# Patient Record
Sex: FEMALE | Race: WHITE | NOT HISPANIC OR LATINO | Employment: UNEMPLOYED | URBAN - METROPOLITAN AREA
[De-identification: names, ages, dates, MRNs, and addresses within clinical notes are randomized per-mention and may not be internally consistent; named-entity substitution may affect disease eponyms.]

---

## 2023-03-24 PROBLEM — M67.432 GANGLION CYST OF DORSUM OF LEFT WRIST: Status: ACTIVE | Noted: 2023-03-24

## 2023-03-24 PROBLEM — L85.3 DRY SKIN: Status: ACTIVE | Noted: 2023-03-24

## 2023-03-24 PROBLEM — R06.02 SHORTNESS OF BREATH AT REST: Status: ACTIVE | Noted: 2023-03-24

## 2023-03-24 PROBLEM — H69.90 EUSTACHIAN TUBE DYSFUNCTION: Status: ACTIVE | Noted: 2023-03-24

## 2023-03-24 PROBLEM — R07.89 CHEST PRESSURE: Status: ACTIVE | Noted: 2023-03-24

## 2023-03-24 PROBLEM — M62.838 MUSCLE SPASM: Status: ACTIVE | Noted: 2023-03-24

## 2023-03-24 PROBLEM — E87.6 HYPOKALEMIA: Status: ACTIVE | Noted: 2023-03-24

## 2023-03-24 PROBLEM — J02.9 SORE THROAT: Status: ACTIVE | Noted: 2023-03-24

## 2023-03-24 PROBLEM — M79.669 CALF PAIN: Status: ACTIVE | Noted: 2023-03-24

## 2023-03-24 PROBLEM — D23.72 DERMATOFIBROMA OF LEFT THIGH: Status: ACTIVE | Noted: 2023-03-24

## 2023-03-24 PROBLEM — R15.2 FECAL URGENCY: Status: ACTIVE | Noted: 2023-03-24

## 2023-03-24 PROBLEM — R20.2 PARESTHESIAS: Status: ACTIVE | Noted: 2023-03-24

## 2023-03-24 PROBLEM — E55.9 VITAMIN D DEFICIENCY: Status: ACTIVE | Noted: 2023-03-24

## 2023-03-24 PROBLEM — M25.50 POLYARTHRALGIA: Status: ACTIVE | Noted: 2023-03-24

## 2023-03-24 PROBLEM — R25.3 FASCICULATIONS OF MUSCLE: Status: ACTIVE | Noted: 2023-03-24

## 2023-03-24 PROBLEM — R59.0 AXILLARY LYMPHADENOPATHY: Status: ACTIVE | Noted: 2023-03-24

## 2023-03-24 PROBLEM — R00.2 PALPITATIONS: Status: ACTIVE | Noted: 2023-03-24

## 2023-03-24 PROBLEM — J06.9 URTI (ACUTE UPPER RESPIRATORY INFECTION): Status: ACTIVE | Noted: 2023-03-24

## 2023-03-24 PROBLEM — H60.10 CELLULITIS OF EXTERNAL EAR: Status: ACTIVE | Noted: 2023-03-24

## 2023-03-24 PROBLEM — H93.8X9 SENSATION OF PLUGGED EAR: Status: ACTIVE | Noted: 2023-03-24

## 2023-03-24 PROBLEM — I49.1 PAC (PREMATURE ATRIAL CONTRACTION): Status: ACTIVE | Noted: 2023-03-24

## 2023-03-24 PROBLEM — R59.9 ENLARGED LYMPH NODES: Status: ACTIVE | Noted: 2023-03-24

## 2023-03-24 PROBLEM — Z04.9 CONDITION NOT FOUND: Status: ACTIVE | Noted: 2023-03-24

## 2023-03-24 PROBLEM — M62.89 MUSCLE TIGHTNESS: Status: ACTIVE | Noted: 2023-03-24

## 2023-03-24 PROBLEM — M62.81 RIGHT-SIDED MUSCLE WEAKNESS: Status: ACTIVE | Noted: 2023-03-24

## 2023-03-24 RX ORDER — ONDANSETRON 4 MG/1
TABLET, FILM COATED ORAL
COMMUNITY
Start: 2022-12-07 | End: 2023-08-10 | Stop reason: ALTCHOICE

## 2023-03-24 RX ORDER — CYCLOBENZAPRINE HCL 5 MG
5 TABLET ORAL NIGHTLY PRN
COMMUNITY
End: 2023-09-05

## 2023-03-24 RX ORDER — VITAMIN A, ASCORBIC ACID, CHOLECALCIFEROL, TOCOPHEROL, THIAMINE MONONITRATE, RIBOFLAVIN, PYRIDOXINE, FOLIC ACID, CYANOCOBALAMIN, CALCIUM CARBONATE, FERROUS FUMARATE, ZINC OXIDE, CUPRIC OXIDE, NIACINAMIDE, AND FISH OIL 27-1-250MG
KIT ORAL DAILY
COMMUNITY
Start: 2022-10-07 | End: 2024-01-03 | Stop reason: ALTCHOICE

## 2023-03-25 ENCOUNTER — OFFICE VISIT (OUTPATIENT)
Dept: PRIMARY CARE | Facility: CLINIC | Age: 28
End: 2023-03-25
Payer: COMMERCIAL

## 2023-03-25 VITALS
SYSTOLIC BLOOD PRESSURE: 104 MMHG | DIASTOLIC BLOOD PRESSURE: 68 MMHG | TEMPERATURE: 98.2 F | HEIGHT: 67 IN | BODY MASS INDEX: 25.11 KG/M2 | WEIGHT: 160 LBS

## 2023-03-25 DIAGNOSIS — L98.9 ECZEMATOUS SKIN LESIONS: Primary | ICD-10-CM

## 2023-03-25 PROCEDURE — 1036F TOBACCO NON-USER: CPT | Performed by: FAMILY MEDICINE

## 2023-03-25 PROCEDURE — 99213 OFFICE O/P EST LOW 20 MIN: CPT | Performed by: FAMILY MEDICINE

## 2023-03-25 ASSESSMENT — PATIENT HEALTH QUESTIONNAIRE - PHQ9
SUM OF ALL RESPONSES TO PHQ9 QUESTIONS 1 AND 2: 0
2. FEELING DOWN, DEPRESSED OR HOPELESS: NOT AT ALL
1. LITTLE INTEREST OR PLEASURE IN DOING THINGS: NOT AT ALL

## 2023-03-25 NOTE — PROGRESS NOTES
"Chief complaint:   Chief Complaint   Patient presents with    Rash     On back, foot        HPI:  Yue Cannon is a 28 y.o. female who presents for evaluation of itchy skin since the end of December. She states she has had this itchy skin and has been tested for cholestasis by her OBGYN (pregnant). She has a few patches that are itchier and come and go in the same spots.     Physical exam:  /68 (BP Location: Left arm, Patient Position: Sitting)   Temp 36.8 °C (98.2 °F)   Ht 1.702 m (5' 7\")   Wt 72.6 kg (160 lb)   BMI 25.06 kg/m²   General: NAD, well appearing female  Skin: dry, few patches of erythematous area back and right dorsal foot    Assessment/Plan   Problem List Items Addressed This Visit    None  Visit Diagnoses       Eczematous skin lesions    -  Primary        Stop use of dryer sheets, fabric softener and switch to unscented detergent. Recommend use of Dove sensitive soap. Moisturize with unscented lotion. Recommend follow up in 1 mo if not improved, sooner if sx change or worsen    Ivy Nicole, DO        "

## 2023-08-10 ENCOUNTER — OFFICE VISIT (OUTPATIENT)
Dept: PRIMARY CARE | Facility: CLINIC | Age: 28
End: 2023-08-10
Payer: COMMERCIAL

## 2023-08-10 VITALS
TEMPERATURE: 98.7 F | BODY MASS INDEX: 24.59 KG/M2 | WEIGHT: 157 LBS | SYSTOLIC BLOOD PRESSURE: 102 MMHG | DIASTOLIC BLOOD PRESSURE: 64 MMHG

## 2023-08-10 DIAGNOSIS — H60.12 CELLULITIS OF LEFT EXTERNAL EAR: Primary | ICD-10-CM

## 2023-08-10 PROCEDURE — 99213 OFFICE O/P EST LOW 20 MIN: CPT | Performed by: FAMILY MEDICINE

## 2023-08-10 PROCEDURE — 1036F TOBACCO NON-USER: CPT | Performed by: FAMILY MEDICINE

## 2023-08-10 RX ORDER — METOPROLOL TARTRATE 25 MG/1
1 TABLET, FILM COATED ORAL EVERY 12 HOURS
COMMUNITY
Start: 2023-08-07

## 2023-08-10 RX ORDER — LANOLIN ALCOHOL/MO/W.PET/CERES
1 CREAM (GRAM) TOPICAL EVERY 12 HOURS
COMMUNITY
Start: 2023-07-17 | End: 2024-01-03 | Stop reason: ALTCHOICE

## 2023-08-10 RX ORDER — SULFAMETHOXAZOLE AND TRIMETHOPRIM 800; 160 MG/1; MG/1
1 TABLET ORAL 2 TIMES DAILY
Qty: 14 TABLET | Refills: 0 | Status: SHIPPED | OUTPATIENT
Start: 2023-08-10 | End: 2023-08-17

## 2023-08-10 ASSESSMENT — PATIENT HEALTH QUESTIONNAIRE - PHQ9
2. FEELING DOWN, DEPRESSED OR HOPELESS: NOT AT ALL
1. LITTLE INTEREST OR PLEASURE IN DOING THINGS: NOT AT ALL
SUM OF ALL RESPONSES TO PHQ9 QUESTIONS 1 AND 2: 0

## 2023-08-10 ASSESSMENT — ENCOUNTER SYMPTOMS: DEPRESSION: 0

## 2023-08-10 NOTE — PROGRESS NOTES
Subjective   Patient ID: [id number]     Yue Cannon is a 28 y.o. female who presents for Earache (Left ear (inside ear and around piercing)).  HPI    She complains of left ear pain around a piercing.  She had the piercing over a year ago.  She states the pain just started today.      No drainage.      Has swollen tissue in the area for the last five months or so.      No hearing problems.  No dizziness.              Objective     /64 (BP Location: Right arm, Patient Position: Sitting)   Temp 37.1 °C (98.7 °F)   Wt 71.2 kg (157 lb)   BMI 24.59 kg/m²      Physical Exam  Constitutional:       Appearance: Normal appearance.   HENT:      Right Ear: Ear canal normal.      Left Ear: Ear canal normal.      Ears:      Comments: TM retracted on the left.  EAC normal.  Posterior margin of pinna tender and swollen in a 1 cm area surrounding to helical piercings.        Neurological:      Mental Status: She is alert.         Assessment/Plan   Problem List Items Addressed This Visit       Cellulitis of external ear - Primary    Relevant Medications    sulfamethoxazole-trimethoprim (Bactrim DS) 800-160 mg tablet     I prescribed antibiotics. I recommend sudafed for your nasal congestion and internal ear pain. Your outer ear pain is likely a skin infection. Return in one week to see Dr Ivy Nicole.  Slick Ndiaye DO

## 2023-08-10 NOTE — PATIENT INSTRUCTIONS
I prescribed antibiotics. I recommend sudafed for your nasal congestion and internal ear pain. Your outer ear pain is likely a skin infection. Return in one week to see Dr Ivy Nicole.

## 2023-09-05 ENCOUNTER — OFFICE VISIT (OUTPATIENT)
Dept: PRIMARY CARE | Facility: CLINIC | Age: 28
End: 2023-09-05
Payer: COMMERCIAL

## 2023-09-05 VITALS — SYSTOLIC BLOOD PRESSURE: 118 MMHG | DIASTOLIC BLOOD PRESSURE: 70 MMHG

## 2023-09-05 DIAGNOSIS — I47.10 SVT (SUPRAVENTRICULAR TACHYCARDIA) (CMS-HCC): ICD-10-CM

## 2023-09-05 DIAGNOSIS — R20.2 TINGLING: Primary | ICD-10-CM

## 2023-09-05 PROBLEM — Z97.5 IUD (INTRAUTERINE DEVICE) IN PLACE: Status: RESOLVED | Noted: 2023-07-11 | Resolved: 2023-09-05

## 2023-09-05 PROBLEM — R55 NEAR SYNCOPE: Status: RESOLVED | Noted: 2023-09-05 | Resolved: 2023-09-05

## 2023-09-05 PROBLEM — L63.9 ALOPECIA AREATA, UNSPECIFIED: Status: RESOLVED | Noted: 2021-07-01 | Resolved: 2023-09-05

## 2023-09-05 PROBLEM — D64.9 NORMOCHROMIC NORMOCYTIC ANEMIA: Status: RESOLVED | Noted: 2023-04-25 | Resolved: 2023-09-05

## 2023-09-05 PROBLEM — M79.601 RIGHT ARM PAIN: Status: RESOLVED | Noted: 2023-06-25 | Resolved: 2023-09-05

## 2023-09-05 PROBLEM — K58.2 IRRITABLE BOWEL SYNDROME WITH BOTH CONSTIPATION AND DIARRHEA: Status: ACTIVE | Noted: 2023-06-25

## 2023-09-05 LAB
ALANINE AMINOTRANSFERASE (SGPT) (U/L) IN SER/PLAS: 34 U/L (ref 7–45)
ALBUMIN (G/DL) IN SER/PLAS: 4.9 G/DL (ref 3.4–5)
ALKALINE PHOSPHATASE (U/L) IN SER/PLAS: 57 U/L (ref 33–110)
ANION GAP IN SER/PLAS: 11 MMOL/L (ref 10–20)
ASPARTATE AMINOTRANSFERASE (SGOT) (U/L) IN SER/PLAS: 22 U/L (ref 9–39)
BILIRUBIN TOTAL (MG/DL) IN SER/PLAS: 0.4 MG/DL (ref 0–1.2)
CALCIUM (MG/DL) IN SER/PLAS: 10 MG/DL (ref 8.6–10.3)
CARBON DIOXIDE, TOTAL (MMOL/L) IN SER/PLAS: 30 MMOL/L (ref 21–32)
CHLORIDE (MMOL/L) IN SER/PLAS: 101 MMOL/L (ref 98–107)
CREATININE (MG/DL) IN SER/PLAS: 0.71 MG/DL (ref 0.5–1.05)
ERYTHROCYTE DISTRIBUTION WIDTH (RATIO) BY AUTOMATED COUNT: 14.3 % (ref 11.5–14.5)
ERYTHROCYTE MEAN CORPUSCULAR HEMOGLOBIN CONCENTRATION (G/DL) BY AUTOMATED: 31.2 G/DL (ref 32–36)
ERYTHROCYTE MEAN CORPUSCULAR VOLUME (FL) BY AUTOMATED COUNT: 91 FL (ref 80–100)
ERYTHROCYTES (10*6/UL) IN BLOOD BY AUTOMATED COUNT: 4.46 X10E12/L (ref 4–5.2)
GFR FEMALE: >90 ML/MIN/1.73M2
GLUCOSE (MG/DL) IN SER/PLAS: 87 MG/DL (ref 74–99)
HEMATOCRIT (%) IN BLOOD BY AUTOMATED COUNT: 40.7 % (ref 36–46)
HEMOGLOBIN (G/DL) IN BLOOD: 12.7 G/DL (ref 12–16)
LEUKOCYTES (10*3/UL) IN BLOOD BY AUTOMATED COUNT: 5.2 X10E9/L (ref 4.4–11.3)
PLATELETS (10*3/UL) IN BLOOD AUTOMATED COUNT: 200 X10E9/L (ref 150–450)
POTASSIUM (MMOL/L) IN SER/PLAS: 4.1 MMOL/L (ref 3.5–5.3)
PROTEIN TOTAL: 7.6 G/DL (ref 6.4–8.2)
SODIUM (MMOL/L) IN SER/PLAS: 138 MMOL/L (ref 136–145)
THYROTROPIN (MIU/L) IN SER/PLAS BY DETECTION LIMIT <= 0.05 MIU/L: 2.63 MIU/L (ref 0.44–3.98)
UREA NITROGEN (MG/DL) IN SER/PLAS: 13 MG/DL (ref 6–23)

## 2023-09-05 PROCEDURE — 99214 OFFICE O/P EST MOD 30 MIN: CPT | Performed by: FAMILY MEDICINE

## 2023-09-05 PROCEDURE — 85027 COMPLETE CBC AUTOMATED: CPT

## 2023-09-05 PROCEDURE — 80053 COMPREHEN METABOLIC PANEL: CPT

## 2023-09-05 PROCEDURE — 93000 ELECTROCARDIOGRAM COMPLETE: CPT | Performed by: FAMILY MEDICINE

## 2023-09-05 PROCEDURE — 84443 ASSAY THYROID STIM HORMONE: CPT

## 2023-09-05 PROCEDURE — 1036F TOBACCO NON-USER: CPT | Performed by: FAMILY MEDICINE

## 2023-09-05 NOTE — PROGRESS NOTES
Chief complaint:   Chief Complaint   Patient presents with    Numbness     Slight numbness on left of face for one week    Photophobia     10 weeks        HPI:  Yue Cannon is a 28 y.o. female who presents for evaluation of numbness she has felt on the left side of her face for 1 week in duration with some intermittent photophobia for 10 weeks or so. The face feels tingling but no weakness. The tingling comes and goes. No rash. She is not sleeping well (had baby 6/24/2023). She fainted 10 days ago and states this is the first time she has fainted. She has a cardiologist since giving birth as she has developed SVT. She forgot to take her Metoprolol the day of fainting.  She got heart palpitations then fainted 10 days ago. She has not been seen for this. She is breast feeding. She last saw her cardiologist early August with discussion of ablation in the future. Prior to starting medication she was getting sx 12 times daily. Now on the medication she gets the sx once every few days lasting 5 seconds or less.     She has sx  of tingling on her left side before after giving birth in the past but this occurred once for 1 day and not this long. (Son is 9 and daughter is 8). She had a CT in the past when this happened but states it was normal. She last had an MRI of the brain 3/21/2022.     She she had an earache secondary to piercing with cellulitis around her left ear seen and treated with Bactrim 8/10/2023. She took the piecing out and took the abx without residual sx.     She is still taking Prenatals. She had her 6 week postpartum visit without issues. She had an uneventful delivery.    Physical exam:  /70   General: NAD, well appearing female  Heart: variability noted in speed of heart rate when auscultating, no murmur appreciated  Lungs: CTAB, no wheezes, rales, rhonchi  Abdomen: soft, non tender, normoactive BS, no organomegaly  Extremities: No LE edema  Neuro: sensation intact to light touch on the face,  motor strength facial normal, no facial droop, CN II-XII grossly intact    Assessment/Plan   Problem List Items Addressed This Visit    None  Visit Diagnoses       Tingling    -  Primary    Relevant Orders    CBC    Comprehensive Metabolic Panel    Tsh With Reflex To Free T4 If Abnormal    SVT (supraventricular tachycardia) (CMS/HCC)        Relevant Orders    ECG 12 lead (Clinic Performed)        Reviewed MRI brain from 3/2022, patient has had similar tingling on the left side with her previous 2 pregnancy/births though not lasting this long. She had an uneventful delivery in June and a recent left sided facial infection treated within the month. EKG performed and recommend her to call her cardiologist especially as she had a syncopal episode after not taking her Metoprolol 10 days ago. She has heart rate variability noted on EKG. Labs were ordered. Follow up 1 weeks pending sx and results.     Ivy Nicole, DO

## 2023-09-14 PROBLEM — R55 SYNCOPE: Status: ACTIVE | Noted: 2023-09-14

## 2023-09-16 ENCOUNTER — HOSPITAL ENCOUNTER (OUTPATIENT)
Facility: HOSPITAL | Age: 28
Setting detail: OUTPATIENT SURGERY
End: 2023-09-16
Attending: INTERNAL MEDICINE | Admitting: INTERNAL MEDICINE
Payer: COMMERCIAL

## 2023-09-16 DIAGNOSIS — R55 SYNCOPE AND COLLAPSE: ICD-10-CM

## 2023-09-16 DIAGNOSIS — I47.10 SUPRAVENTRICULAR TACHYCARDIA (CMS-HCC): ICD-10-CM

## 2023-09-19 ENCOUNTER — OFFICE VISIT (OUTPATIENT)
Dept: PRIMARY CARE | Facility: CLINIC | Age: 28
End: 2023-09-19
Payer: COMMERCIAL

## 2023-09-19 VITALS — SYSTOLIC BLOOD PRESSURE: 116 MMHG | DIASTOLIC BLOOD PRESSURE: 70 MMHG

## 2023-09-19 DIAGNOSIS — M79.89 SOFT TISSUE MASS: ICD-10-CM

## 2023-09-19 DIAGNOSIS — R20.2 PARESTHESIAS: Primary | ICD-10-CM

## 2023-09-19 PROCEDURE — 99213 OFFICE O/P EST LOW 20 MIN: CPT | Performed by: FAMILY MEDICINE

## 2023-09-19 PROCEDURE — 1036F TOBACCO NON-USER: CPT | Performed by: FAMILY MEDICINE

## 2023-09-19 NOTE — PROGRESS NOTES
Chief complaint:   Chief Complaint   Patient presents with    Follow-up     Follow up for face numbness    Arm Swelling     Swelling and pain in both arms        HPI:  Yue Cannon is a 28 y.o. female who presents for evaluation of persistent intermittent facial numbness and pain and swelling in her forearms right mainly and is has been present since she gave birth. She does have a neurologist but has not been assessed for this. She is having a test performed in October by her EP provider.     Physical exam:  /70   General: NAD, well appearing female  Heart: RRR, no mumur appreciated  Lungs: CTAB, no wheezes, rales, rhonchi  Abdomen: soft, non tender, normoactive BS, no organomegaly  Extremities: No LE edema, right forearm with fullness and firmness to the medial proximal area which is larger the the left.     Assessment/Plan   Problem List Items Addressed This Visit       Paresthesias - Primary    Soft tissue mass    Relevant Orders    US extremity nonvascular real time w image documentation limited anatomic specific     Follow up with neurology   Ivy Nicole DO

## 2023-10-02 ENCOUNTER — TELEPHONE (OUTPATIENT)
Dept: PRIMARY CARE | Facility: CLINIC | Age: 28
End: 2023-10-02
Payer: COMMERCIAL

## 2023-10-02 DIAGNOSIS — R20.0 FACIAL NUMBNESS: Primary | ICD-10-CM

## 2023-10-02 NOTE — TELEPHONE ENCOUNTER
Pt was not able to get into neurology until December. She would like an MRI order placed due to the numbness on the left side of her face

## 2023-10-04 ENCOUNTER — OFFICE VISIT (OUTPATIENT)
Dept: NEUROLOGY | Facility: CLINIC | Age: 28
End: 2023-10-04
Payer: COMMERCIAL

## 2023-10-04 VITALS
DIASTOLIC BLOOD PRESSURE: 74 MMHG | SYSTOLIC BLOOD PRESSURE: 108 MMHG | HEIGHT: 67 IN | WEIGHT: 152 LBS | TEMPERATURE: 97.3 F | BODY MASS INDEX: 23.86 KG/M2 | RESPIRATION RATE: 18 BRPM

## 2023-10-04 DIAGNOSIS — R20.2 NUMBNESS AND TINGLING: ICD-10-CM

## 2023-10-04 DIAGNOSIS — R20.0 NUMBNESS AND TINGLING: ICD-10-CM

## 2023-10-04 DIAGNOSIS — R20.0 LEFT FACIAL NUMBNESS: Primary | ICD-10-CM

## 2023-10-04 PROCEDURE — 99214 OFFICE O/P EST MOD 30 MIN: CPT | Performed by: PSYCHIATRY & NEUROLOGY

## 2023-10-04 PROCEDURE — 1036F TOBACCO NON-USER: CPT | Performed by: PSYCHIATRY & NEUROLOGY

## 2023-10-04 NOTE — PROGRESS NOTES
HPI  28 y.o. female presenting for follow up regarding neurological symptoms.    The patient was seen by me for right sided muscle twitching and right sided weakness.   MRI Brain and C Spine were unremarkable.  Since the last visit, she became pregnant.  During her pregnancy, she developed episodes of numbness/tinging in her feet (and to a lesser extent, her hands).  The numbness in her feet occurs mainly when crossing her legs.  It lasts a few minutes and then goes away.  The tingling in her hands is in all 5 fingers.  It often occurs when she wakes up.  She denies any weakness but does note a heaviness in her legs after sitting for a prolonged period of time.  She denies any neck pain.  She does note occasional low back pain.  She denies any bladder incontinence.  She does note left facial numbness which started 1 month ago.  It occurs mainly around her left V2 region.  It lasts a few minutes but occurs every day.  She denies any double vision, slurred speech, facial droop, or loss of balance.          Current Outpatient Medications:     magnesium oxide (Mag-Ox) 400 mg (241.3 mg magnesium) tablet, Take 1 tablet (400 mg) by mouth every 12 hours., Disp: , Rfl:     metoprolol tartrate (Lopressor) 25 mg tablet, Take 1 tablet (25 mg) by mouth every 12 hours., Disp: , Rfl:     prenatal72-iron fum-FA-om3-dha (Prenatal Plus DHA) 27 mg iron-1 mg -312 mg-250 mg combo pack, Take by mouth once daily. TAKE 1 TAB & 1 CAPSULE BY MOUTH EVERY DAY, Disp: , Rfl:       Objective:  Gen: NAD  Neuro:  --HIF: A&O X 3, repetition and naming intact  --CN:  PERRLA, EOMI, VFF, no visible facial asymmetry, facial sensation intact, no tongue or palatal deviation, SCM intact  --Motor: Moves all 4 extremities equally; no focal deficits  --Sensory: Intact to light touch, intact to pinprick  --Reflex: 3+ symmetric, toes down, Samson's negative  --Cerebellum: FTN and HTS intact  --Gait: normal, narrow based, good stride    Relevant Results  MRI  Brain (I personally reviewed the images/tracings with the following interpretation)  Normal    MRI C Spine (I personally reviewed the images/tracings with the following interpretation)  No evidence of myelopath      Assessment:  Numbness and Tingling in all 4 extremities  - symptoms started during her pregnancy  - she describes episodes of numbness/tingling in her hands and feet bilaterally  - she also notes a heaviness feeling in her legs after sitting for a prolonged period of time  - neurological examination is normal      2.  Left Facial Numbness  - she developed numbness and tingling of her left V2 region    Plan:  - EMG/NCV  - MRI Brain with contrast    Bruce Martin MD  [facility]  Department of Neurology

## 2023-10-16 ENCOUNTER — ANCILLARY PROCEDURE (OUTPATIENT)
Dept: RADIOLOGY | Facility: CLINIC | Age: 28
End: 2023-10-16
Payer: COMMERCIAL

## 2023-10-16 DIAGNOSIS — R20.0 LEFT FACIAL NUMBNESS: ICD-10-CM

## 2023-10-16 PROCEDURE — 70551 MRI BRAIN STEM W/O DYE: CPT

## 2023-10-16 PROCEDURE — 70551 MRI BRAIN STEM W/O DYE: CPT | Performed by: RADIOLOGY

## 2023-11-14 ENCOUNTER — APPOINTMENT (OUTPATIENT)
Dept: NEUROLOGY | Facility: HOSPITAL | Age: 28
End: 2023-11-14
Payer: COMMERCIAL

## 2023-11-28 ENCOUNTER — APPOINTMENT (OUTPATIENT)
Dept: PRIMARY CARE | Facility: CLINIC | Age: 28
End: 2023-11-28
Payer: COMMERCIAL

## 2023-12-05 ENCOUNTER — TELEPHONE (OUTPATIENT)
Dept: CARDIOLOGY | Facility: CLINIC | Age: 28
End: 2023-12-05
Payer: COMMERCIAL

## 2023-12-05 NOTE — TELEPHONE ENCOUNTER
Pt left message stating that she is going to have an ablation done.  Per pt she has been fighting an ear infection.  Pt states that she is on another antibiotic.  Pt wanting to know if it is okay to have procedure done with this going on.    Pt also as a breast feeding question.  She would like to know how long after does she need to pump and dump after procedure    Routed to Radha DOWELL RN for follow up.

## 2023-12-06 ENCOUNTER — APPOINTMENT (OUTPATIENT)
Dept: OTOLARYNGOLOGY | Facility: CLINIC | Age: 28
End: 2023-12-06
Payer: COMMERCIAL

## 2023-12-06 ENCOUNTER — PREP FOR PROCEDURE (OUTPATIENT)
Dept: CARDIOLOGY | Facility: CLINIC | Age: 28
End: 2023-12-06
Payer: COMMERCIAL

## 2023-12-06 DIAGNOSIS — I47.10 PAROXYSMAL SUPRAVENTRICULAR TACHYCARDIA (CMS-HCC): Primary | ICD-10-CM

## 2023-12-06 NOTE — TELEPHONE ENCOUNTER
Pt thought the was rescheduled for EP study for tomorrow. Case request put in and sent to physician to sign so that patient can be rescheduled.     Also advised patient that she will need to follow up with PCP or ENT to be cleared from her ear infection prior to procedure per Dr. Kirk.     Regarding pumping and dumping after her procedure: advised to ask anesthesia.

## 2023-12-14 ENCOUNTER — APPOINTMENT (OUTPATIENT)
Dept: OTOLARYNGOLOGY | Facility: CLINIC | Age: 28
End: 2023-12-14
Payer: COMMERCIAL

## 2023-12-21 ENCOUNTER — APPOINTMENT (OUTPATIENT)
Dept: NEUROLOGY | Facility: CLINIC | Age: 28
End: 2023-12-21
Payer: COMMERCIAL

## 2023-12-22 ENCOUNTER — OFFICE VISIT (OUTPATIENT)
Dept: PRIMARY CARE | Facility: CLINIC | Age: 28
End: 2023-12-22
Payer: COMMERCIAL

## 2023-12-22 VITALS — SYSTOLIC BLOOD PRESSURE: 97 MMHG | DIASTOLIC BLOOD PRESSURE: 65 MMHG | TEMPERATURE: 96.9 F

## 2023-12-22 DIAGNOSIS — H61.22 IMPACTED CERUMEN OF LEFT EAR: Primary | ICD-10-CM

## 2023-12-22 PROCEDURE — 1036F TOBACCO NON-USER: CPT | Performed by: FAMILY MEDICINE

## 2023-12-22 PROCEDURE — 99212 OFFICE O/P EST SF 10 MIN: CPT | Performed by: FAMILY MEDICINE

## 2023-12-22 PROCEDURE — 69210 REMOVE IMPACTED EAR WAX UNI: CPT | Performed by: FAMILY MEDICINE

## 2023-12-22 NOTE — PROGRESS NOTES
Chief complaint:   Chief Complaint   Patient presents with    Ear Fullness     Left ear blocked, started yesterday       HPI:  Yue Cannon is a 28 y.o. female who presents for evaluation of decreased hearing without pain in left ear since yesterday. She tried ear wax remover liquid.    Physical exam:  BP 97/65   Temp 36.1 °C (96.9 °F)   General: NAD, well appearing female  Ears: TM left ear with cerumen present    Ear Cerumen Removal    Date/Time: 12/22/2023 12:42 PM    Performed by: Ivy Nicole DO  Authorized by: Ivy Nicole DO    Consent:     Consent obtained:  Verbal    Consent given by:  Patient    Risks discussed:  Pain and incomplete removal  Universal protocol:     Patient identity confirmed:  Verbally with patient  Procedure details:     Location:  L ear    Procedure type: curette      Procedure outcomes: cerumen removed    Comments:      Shaista Sanders MA initiated with irrigation and I was able to remove with curette without complication and with improvement in her hearing        Assessment/Plan   Problem List Items Addressed This Visit    None  Visit Diagnoses       Impacted cerumen of left ear    -  Primary        Removed in office, improved hearing    Ivy Nicole DO

## 2024-01-03 ENCOUNTER — OFFICE VISIT (OUTPATIENT)
Dept: PRIMARY CARE | Facility: CLINIC | Age: 29
End: 2024-01-03
Payer: COMMERCIAL

## 2024-01-03 VITALS — SYSTOLIC BLOOD PRESSURE: 109 MMHG | DIASTOLIC BLOOD PRESSURE: 76 MMHG | TEMPERATURE: 98.3 F

## 2024-01-03 DIAGNOSIS — H65.02 NON-RECURRENT ACUTE SEROUS OTITIS MEDIA OF LEFT EAR: Primary | ICD-10-CM

## 2024-01-03 PROCEDURE — 99213 OFFICE O/P EST LOW 20 MIN: CPT | Performed by: FAMILY MEDICINE

## 2024-01-03 PROCEDURE — 1036F TOBACCO NON-USER: CPT | Performed by: FAMILY MEDICINE

## 2024-01-03 RX ORDER — AZITHROMYCIN 250 MG/1
TABLET, FILM COATED ORAL
Qty: 6 TABLET | Refills: 0 | Status: SHIPPED | OUTPATIENT
Start: 2024-01-03 | End: 2024-01-03

## 2024-01-03 RX ORDER — OXYMETAZOLINE HYDROCHLORIDE 0.05 G/100ML
2 SPRAY, METERED NASAL 2 TIMES DAILY
Qty: 14.7 ML | Refills: 0 | Status: ON HOLD | OUTPATIENT
Start: 2024-01-03 | End: 2024-04-17 | Stop reason: ALTCHOICE

## 2024-01-03 NOTE — PROGRESS NOTES
Subjective   Patient ID: [id number]     Yue Cannon is a 28 y.o. female who presents for Cerumen Impaction (Left ear).    HPI   A few days of left ear crackling and pressure;  no recent air travel;  Yue has had this before on the left sided;  was seen by urgent care and has been taking amoxicillin every twelve hours and pain is decreasing    Review of Systems  General-no unexplained fever or chills  OPTH-No dry eyes, itchy eyes, or blurry vision   ENT-No sore throat;  has muffled hearing on the left  NECK-no stiffness, swelling or pain  Urol-not pregnant    Objective     /76 (BP Location: Left arm, Patient Position: Sitting)   Temp 36.8 °C (98.3 °F) (Oral)      Physical Exam  Eyes-pupils equal round, reactive to light and accommodation, fundi with normal cup/disc ratio, conjunctiva without redness or discharge  General- well defined, well nourished, well hydrated individual in NAD  Skin- normal color and turgor; without nail pitting  Head-normocephalic without masses or tenderness  Ears-normal pinnae, and canals, with normal landmarks and light reflex of tympanic membranes bilaterally  Nose-septum in the midline, normal mucosa bilaterally  Throat- without erythema or exudate, uvula in midline  Neck-supple without lymphadenopathy or thyromegaly; no carotid bruits      Assessment/Plan     Problem List Items Addressed This Visit    None  Visit Diagnoses       Non-recurrent acute serous otitis media of left ear    -  Primary    Relevant Medications    oxymetazoline (Afrin, oxymetazoline,) 0.05 % nasal spray        Continue the amoxicillin and return in two weeks if your ear still feels blocked.    Silvestre Nicole MD

## 2024-04-17 ENCOUNTER — HOSPITAL ENCOUNTER (OUTPATIENT)
Facility: HOSPITAL | Age: 29
Setting detail: OUTPATIENT SURGERY
Discharge: HOME | End: 2024-04-17
Attending: INTERNAL MEDICINE | Admitting: INTERNAL MEDICINE
Payer: COMMERCIAL

## 2024-04-17 ENCOUNTER — APPOINTMENT (OUTPATIENT)
Dept: CARDIOLOGY | Facility: HOSPITAL | Age: 29
End: 2024-04-17
Payer: COMMERCIAL

## 2024-04-17 VITALS
BODY MASS INDEX: 23.88 KG/M2 | RESPIRATION RATE: 16 BRPM | OXYGEN SATURATION: 95 % | SYSTOLIC BLOOD PRESSURE: 98 MMHG | DIASTOLIC BLOOD PRESSURE: 60 MMHG | HEART RATE: 90 BPM | WEIGHT: 152.12 LBS | HEIGHT: 67 IN | TEMPERATURE: 99.3 F

## 2024-04-17 DIAGNOSIS — Z98.890 HISTORY OF CARDIAC RADIOFREQUENCY ABLATION (RFA): ICD-10-CM

## 2024-04-17 DIAGNOSIS — I47.10 PAROXYSMAL SUPRAVENTRICULAR TACHYCARDIA (CMS-HCC): Primary | ICD-10-CM

## 2024-04-17 LAB
ANION GAP SERPL CALC-SCNC: 11 MMOL/L (ref 10–20)
APTT PPP: 31 SECONDS (ref 27–38)
B-HCG SERPL-ACNC: <2 MIU/ML
BUN SERPL-MCNC: 13 MG/DL (ref 6–23)
CALCIUM SERPL-MCNC: 9.1 MG/DL (ref 8.6–10.3)
CHLORIDE SERPL-SCNC: 104 MMOL/L (ref 98–107)
CO2 SERPL-SCNC: 28 MMOL/L (ref 21–32)
CREAT SERPL-MCNC: 0.64 MG/DL (ref 0.5–1.05)
EGFRCR SERPLBLD CKD-EPI 2021: >90 ML/MIN/1.73M*2
ERYTHROCYTE [DISTWIDTH] IN BLOOD BY AUTOMATED COUNT: 13.4 % (ref 11.5–14.5)
GLUCOSE SERPL-MCNC: 88 MG/DL (ref 74–99)
HCT VFR BLD AUTO: 36.3 % (ref 36–46)
HGB BLD-MCNC: 12 G/DL (ref 12–16)
INR PPP: 1 (ref 0.9–1.1)
MCH RBC QN AUTO: 28.6 PG (ref 26–34)
MCHC RBC AUTO-ENTMCNC: 33.1 G/DL (ref 32–36)
MCV RBC AUTO: 87 FL (ref 80–100)
NRBC BLD-RTO: 0 /100 WBCS (ref 0–0)
PLATELET # BLD AUTO: 168 X10*3/UL (ref 150–450)
POTASSIUM SERPL-SCNC: 3.6 MMOL/L (ref 3.5–5.3)
PROTHROMBIN TIME: 10.7 SECONDS (ref 9.8–12.8)
RBC # BLD AUTO: 4.19 X10*6/UL (ref 4–5.2)
SODIUM SERPL-SCNC: 139 MMOL/L (ref 136–145)
WBC # BLD AUTO: 5 X10*3/UL (ref 4.4–11.3)

## 2024-04-17 PROCEDURE — 99152 MOD SED SAME PHYS/QHP 5/>YRS: CPT | Performed by: INTERNAL MEDICINE

## 2024-04-17 PROCEDURE — 84702 CHORIONIC GONADOTROPIN TEST: CPT | Performed by: NURSE PRACTITIONER

## 2024-04-17 PROCEDURE — 93623 PRGRMD STIMJ&PACG IV RX NFS: CPT | Performed by: INTERNAL MEDICINE

## 2024-04-17 PROCEDURE — 85027 COMPLETE CBC AUTOMATED: CPT | Performed by: NURSE PRACTITIONER

## 2024-04-17 PROCEDURE — 93621 COMP EP EVL L PAC&REC C SINS: CPT | Performed by: INTERNAL MEDICINE

## 2024-04-17 PROCEDURE — G0269 OCCLUSIVE DEVICE IN VEIN ART: HCPCS | Mod: TC | Performed by: INTERNAL MEDICINE

## 2024-04-17 PROCEDURE — 2500000004 HC RX 250 GENERAL PHARMACY W/ HCPCS (ALT 636 FOR OP/ED): Performed by: INTERNAL MEDICINE

## 2024-04-17 PROCEDURE — 2500000006 HC RX 250 W HCPCS SELF ADMINISTERED DRUGS (ALT 637 FOR ALL PAYERS): Performed by: NURSE PRACTITIONER

## 2024-04-17 PROCEDURE — 93010 ELECTROCARDIOGRAM REPORT: CPT | Performed by: INTERNAL MEDICINE

## 2024-04-17 PROCEDURE — 99153 MOD SED SAME PHYS/QHP EA: CPT | Performed by: INTERNAL MEDICINE

## 2024-04-17 PROCEDURE — 93613 INTRACARDIAC EPHYS 3D MAPG: CPT | Performed by: INTERNAL MEDICINE

## 2024-04-17 PROCEDURE — 99223 1ST HOSP IP/OBS HIGH 75: CPT | Performed by: INTERNAL MEDICINE

## 2024-04-17 PROCEDURE — C1760 CLOSURE DEV, VASC: HCPCS | Performed by: INTERNAL MEDICINE

## 2024-04-17 PROCEDURE — 93005 ELECTROCARDIOGRAM TRACING: CPT | Mod: 59

## 2024-04-17 PROCEDURE — C1733 CATH, EP, OTHR THAN COOL-TIP: HCPCS | Performed by: INTERNAL MEDICINE

## 2024-04-17 PROCEDURE — 7100000010 HC PHASE TWO TIME - EACH INCREMENTAL 1 MINUTE: Performed by: INTERNAL MEDICINE

## 2024-04-17 PROCEDURE — 93620 COMP EP EVL R AT VEN PAC&REC: CPT | Performed by: INTERNAL MEDICINE

## 2024-04-17 PROCEDURE — 7100000009 HC PHASE TWO TIME - INITIAL BASE CHARGE: Performed by: INTERNAL MEDICINE

## 2024-04-17 PROCEDURE — 2500000005 HC RX 250 GENERAL PHARMACY W/O HCPCS: Performed by: INTERNAL MEDICINE

## 2024-04-17 PROCEDURE — 85610 PROTHROMBIN TIME: CPT | Performed by: NURSE PRACTITIONER

## 2024-04-17 PROCEDURE — C1730 CATH, EP, 19 OR FEW ELECT: HCPCS | Performed by: INTERNAL MEDICINE

## 2024-04-17 PROCEDURE — 2500000004 HC RX 250 GENERAL PHARMACY W/ HCPCS (ALT 636 FOR OP/ED): Performed by: NURSE PRACTITIONER

## 2024-04-17 PROCEDURE — C1732 CATH, EP, DIAG/ABL, 3D/VECT: HCPCS | Performed by: INTERNAL MEDICINE

## 2024-04-17 PROCEDURE — 80048 BASIC METABOLIC PNL TOTAL CA: CPT | Performed by: NURSE PRACTITIONER

## 2024-04-17 PROCEDURE — 36415 COLL VENOUS BLD VENIPUNCTURE: CPT | Performed by: NURSE PRACTITIONER

## 2024-04-17 PROCEDURE — 2780000003 HC OR 278 NO HCPCS: Performed by: INTERNAL MEDICINE

## 2024-04-17 PROCEDURE — 76937 US GUIDE VASCULAR ACCESS: CPT | Performed by: INTERNAL MEDICINE

## 2024-04-17 PROCEDURE — 2500000001 HC RX 250 WO HCPCS SELF ADMINISTERED DRUGS (ALT 637 FOR MEDICARE OP): Performed by: NURSE PRACTITIONER

## 2024-04-17 PROCEDURE — 2720000007 HC OR 272 NO HCPCS: Performed by: INTERNAL MEDICINE

## 2024-04-17 RX ORDER — FENTANYL CITRATE 50 UG/ML
INJECTION, SOLUTION INTRAMUSCULAR; INTRAVENOUS AS NEEDED
Status: DISCONTINUED | OUTPATIENT
Start: 2024-04-17 | End: 2024-04-17 | Stop reason: HOSPADM

## 2024-04-17 RX ORDER — ASPIRIN 325 MG
325 TABLET, DELAYED RELEASE (ENTERIC COATED) ORAL DAILY
Status: DISCONTINUED | OUTPATIENT
Start: 2024-04-17 | End: 2024-04-17

## 2024-04-17 RX ORDER — ASPIRIN 325 MG
325 TABLET, DELAYED RELEASE (ENTERIC COATED) ORAL DAILY
Start: 2024-04-18 | End: 2024-04-17 | Stop reason: HOSPADM

## 2024-04-17 RX ORDER — POTASSIUM CHLORIDE 20 MEQ/1
20 TABLET, EXTENDED RELEASE ORAL ONCE
Status: COMPLETED | OUTPATIENT
Start: 2024-04-17 | End: 2024-04-17

## 2024-04-17 RX ORDER — ADENOSINE 3 MG/ML
INJECTION, SOLUTION INTRAVENOUS AS NEEDED
Status: DISCONTINUED | OUTPATIENT
Start: 2024-04-17 | End: 2024-04-17 | Stop reason: HOSPADM

## 2024-04-17 RX ORDER — LIDOCAINE HYDROCHLORIDE 10 MG/ML
INJECTION, SOLUTION EPIDURAL; INFILTRATION; INTRACAUDAL; PERINEURAL AS NEEDED
Status: DISCONTINUED | OUTPATIENT
Start: 2024-04-17 | End: 2024-04-17 | Stop reason: HOSPADM

## 2024-04-17 RX ORDER — MIDAZOLAM HYDROCHLORIDE 1 MG/ML
INJECTION, SOLUTION INTRAMUSCULAR; INTRAVENOUS AS NEEDED
Status: DISCONTINUED | OUTPATIENT
Start: 2024-04-17 | End: 2024-04-17 | Stop reason: HOSPADM

## 2024-04-17 RX ORDER — CHLORHEXIDINE GLUCONATE 40 MG/ML
SOLUTION TOPICAL ONCE
Status: COMPLETED | OUTPATIENT
Start: 2024-04-17 | End: 2024-04-17

## 2024-04-17 RX ORDER — SODIUM CHLORIDE 9 MG/ML
20 INJECTION, SOLUTION INTRAVENOUS CONTINUOUS
Status: DISCONTINUED | OUTPATIENT
Start: 2024-04-17 | End: 2024-04-17

## 2024-04-17 RX ADMIN — POTASSIUM CHLORIDE 20 MEQ: 1500 TABLET, EXTENDED RELEASE ORAL at 19:29

## 2024-04-17 RX ADMIN — Medication: at 12:25

## 2024-04-17 RX ADMIN — SODIUM CHLORIDE 20 ML/HR: 9 INJECTION, SOLUTION INTRAVENOUS at 12:22

## 2024-04-17 ASSESSMENT — ENCOUNTER SYMPTOMS
FATIGUE: 0
PALPITATIONS: 1
SHORTNESS OF BREATH: 0

## 2024-04-17 ASSESSMENT — PAIN - FUNCTIONAL ASSESSMENT
PAIN_FUNCTIONAL_ASSESSMENT: 0-10
PAIN_FUNCTIONAL_ASSESSMENT: 0-10

## 2024-04-17 ASSESSMENT — COLUMBIA-SUICIDE SEVERITY RATING SCALE - C-SSRS
1. IN THE PAST MONTH, HAVE YOU WISHED YOU WERE DEAD OR WISHED YOU COULD GO TO SLEEP AND NOT WAKE UP?: NO
2. HAVE YOU ACTUALLY HAD ANY THOUGHTS OF KILLING YOURSELF?: NO
6. HAVE YOU EVER DONE ANYTHING, STARTED TO DO ANYTHING, OR PREPARED TO DO ANYTHING TO END YOUR LIFE?: NO

## 2024-04-17 ASSESSMENT — PAIN SCALES - GENERAL: PAINLEVEL_OUTOF10: 0 - NO PAIN

## 2024-04-17 NOTE — NURSING NOTE
Patient received from EP lab into room 208 S/P EP studies. Rt groin site with biocclusive dressing dry/intact, no hematoma, no ecchymosis. Patient instructed to lie flat with rt leg immobile first hour and total bedrest for 2 hours as femoral puncture sites were sealed with Vascade closure devices. Patient verbalized understanding. Call bell within reach.  at bedside.

## 2024-04-17 NOTE — H&P
History Of Present Illness  Yue Cannon is a 29 y.o. female presenting with tachycardia.    She has palpitation and syncope.    Shared decision making performed.  All questions answered.  Econsent confirmed.         Past Medical History  Past Medical History:   Diagnosis Date    Alopecia areata, unspecified 07/01/2021    Anemia during pregnancy in third trimester ([facility]) 07/24/2014    Formatting of this note might be different from the original. -Hbg 9.6-->9.9 -Ferritin/TIBC and Iron normal -B12/Folate normal -Blood management referral placed --> recommended Hematology referral    Generalized skin eruption due to drugs and medicaments taken internally 06/10/2020    Drug rash    Lower abdominal pain, unspecified 10/28/2021    Lower abdominal pain of unknown etiology    Near syncope 09/05/2023    Nonscarring hair loss, unspecified 04/26/2021    Hair loss    Normochromic normocytic anemia 04/25/2023    Formatting of this note might be different from the original. ASSESSMENT: Normocytic, normochromic anemia with Hgb 9.9 - Labs significant for Hgb 9.9, normocytic, normochromic, with appropriately elevated retic. normal B12 and folate. Normal iron studies with ferritin 46.9. Normal kidney and liver function. Retic appropriately elevated - Denies bleeding, bruising, trauma - Reports right axillary L    Personal history of other diseases of the musculoskeletal system and connective tissue     History of scoliosis    Personal history of other diseases of the nervous system and sense organs 07/07/2017    History of earache    PSVT (paroxysmal supraventricular tachycardia) (CMS-HCC) 09/05/2023    Scoliosis 04/22/2014       Surgical History  Past Surgical History:   Procedure Laterality Date    OTHER SURGICAL HISTORY  03/01/2022    [place] tooth extraction        Social History  She reports that she has never smoked. She has never been exposed to tobacco smoke. She has never used smokeless tobacco. She reports that she  "does not use drugs. No history on file for alcohol use.    Family History  Family History   Problem Relation Name Age of Onset    Arthritis Mother      Hyperlipidemia Father      Hypertension Father      Other (Prior ectopic pregnany, antepartum) Sister      Stroke Father's Sister      Hypertension Father's Sister      Breast cancer Father's Sister          Allergies  Cefdinir    Review of Systems   Constitutional:  Negative for fatigue.   Respiratory:  Negative for shortness of breath.    Cardiovascular:  Positive for palpitations.   Neurological:  Positive for syncope.   All other systems reviewed and are negative.       Physical Exam  Constitutional:       Appearance: Normal appearance.   HENT:      Head: Normocephalic.      Mouth/Throat:      Mouth: Mucous membranes are dry.   Eyes:      Extraocular Movements: Extraocular movements intact.   Cardiovascular:      Rate and Rhythm: Regular rhythm.      Heart sounds: Normal heart sounds.   Pulmonary:      Breath sounds: Normal breath sounds.   Musculoskeletal:      Cervical back: Neck supple.   Skin:     General: Skin is dry.   Neurological:      Mental Status: She is alert and oriented to person, place, and time.   Psychiatric:         Mood and Affect: Mood normal.          Last Recorded Vitals  Blood pressure 96/58, pulse 77, temperature 37.4 °C (99.3 °F), temperature source Temporal, resp. rate 16, height 1.702 m (5' 7\"), weight 69 kg (152 lb 1.9 oz), last menstrual period 04/17/2024, SpO2 94%.    Relevant Results      Lab Results   Component Value Date    WBC 5.0 04/17/2024    HGB 12.0 04/17/2024    HCT 36.3 04/17/2024    MCV 87 04/17/2024     04/17/2024      Lab Results   Component Value Date    GLUCOSE 88 04/17/2024    CALCIUM 9.1 04/17/2024     04/17/2024    K 3.6 04/17/2024    CO2 28 04/17/2024     04/17/2024    BUN 13 04/17/2024    CREATININE 0.64 04/17/2024      Lab Results   Component Value Date    INR 1.0 04/17/2024    PROTIME 10.7 " 04/17/2024             Assessment/Plan   Principal Problem:    Paroxysmal supraventricular tachycardia (CMS-[facility])  Syncope    Shared decision making performed  All questions answered  Treatment options, risks, benefits, and imponderables reviewed.    Counseling over 50% visit for discussion regarding  normal rhythm, arrhythmia, medciations, evaluation, possible no inducible arrhythmia, treatment options, risks, benefits, and imponderables. All questions answered. Econsent confirmed.    I spent 85 minutes in the professional and overall care of this patient.      Polina Kirk MD

## 2024-04-17 NOTE — DISCHARGE INSTRUCTIONS
Refer to [facility] Heart Discharge handout for activity restrictions and right groin site care.

## 2024-04-18 NOTE — NURSING NOTE
Patient and spouse given discharge instructions regarding medication education, site care and activity restrictions as well as follow-up appointments. Patient informed that since ablation was not done, no need to take aspirin for 30 days and this was crossed off of her home-going instructions. Vascade card given to patient. Call made to pharmacist, Suni, who verified that it is ok for this patient to continue to nurse her 9 month old baby as Versed and Fentanyl clears maternal system after 4 hours.

## 2024-04-23 ENCOUNTER — TELEPHONE (OUTPATIENT)
Dept: CARDIOLOGY | Facility: CLINIC | Age: 29
End: 2024-04-23

## 2024-04-23 NOTE — TELEPHONE ENCOUNTER
Patient called and left a voicemail. She stated that she recently had an EP ablation with Dr. Kirk. She stated that she has soreness in the area. There is a lump start started the size of a pea and is now the size of a quarter. She is asking if there is anything she can do at home or does she need to be seen?

## 2024-04-25 LAB
ATRIAL RATE: 63 BPM
ATRIAL RATE: 72 BPM
P AXIS: 38 DEGREES
P AXIS: 52 DEGREES
P OFFSET: 202 MS
P ONSET: 149 MS
PR INTERVAL: 148 MS
PR INTERVAL: 160 MS
Q ONSET: 223 MS
Q ONSET: 226 MS
QRS COUNT: 10 BEATS
QRS COUNT: 11 BEATS
QRS DURATION: 74 MS
QRS DURATION: 84 MS
QT INTERVAL: 406 MS
QT INTERVAL: 410 MS
QTC CALCULATION(BAZETT): 419 MS
QTC CALCULATION(BAZETT): 444 MS
QTC FREDERICIA: 416 MS
QTC FREDERICIA: 431 MS
R AXIS: 72 DEGREES
R AXIS: 75 DEGREES
T AXIS: 20 DEGREES
T AXIS: 5 DEGREES
T OFFSET: 428 MS
T OFFSET: 429 MS
VENTRICULAR RATE: 63 BPM
VENTRICULAR RATE: 72 BPM

## 2024-07-17 ENCOUNTER — APPOINTMENT (OUTPATIENT)
Dept: CARDIOLOGY | Facility: CLINIC | Age: 29
End: 2024-07-17
Payer: COMMERCIAL

## 2024-07-17 DIAGNOSIS — I47.10 PAROXYSMAL SUPRAVENTRICULAR TACHYCARDIA (CMS-HCC): ICD-10-CM

## 2024-07-17 PROCEDURE — 93225 XTRNL ECG REC<48 HRS REC: CPT | Performed by: INTERNAL MEDICINE

## 2024-07-17 PROCEDURE — 93227 XTRNL ECG REC<48 HR R&I: CPT | Performed by: INTERNAL MEDICINE

## 2024-08-12 ENCOUNTER — APPOINTMENT (OUTPATIENT)
Dept: CARDIOLOGY | Facility: CLINIC | Age: 29
End: 2024-08-12
Payer: COMMERCIAL

## 2024-08-13 DIAGNOSIS — I47.10 PAROXYSMAL SUPRAVENTRICULAR TACHYCARDIA (CMS-HCC): ICD-10-CM

## 2024-08-13 DIAGNOSIS — I49.1 PAC (PREMATURE ATRIAL CONTRACTION): ICD-10-CM

## 2024-08-13 DIAGNOSIS — R00.2 PALPITATIONS: ICD-10-CM

## 2024-08-13 RX ORDER — METOPROLOL TARTRATE 25 MG/1
25 TABLET, FILM COATED ORAL EVERY 12 HOURS
Qty: 180 TABLET | Refills: 3 | Status: SHIPPED | OUTPATIENT
Start: 2024-08-13 | End: 2025-08-08

## 2024-08-13 NOTE — TELEPHONE ENCOUNTER
Received request for prescription refill for patient.  Patient follows with Dr. Polina Kirk MD, FACC, FACP, RS     Request is for metoprolol  Is patient currently on medication- yes    Last OV- 9/12/23  Next OV- needs scheduling     [facility]- Please schedule appointment and route refill to Radha Walters RN to complete after scheduling.

## 2024-10-07 ENCOUNTER — APPOINTMENT (OUTPATIENT)
Dept: CARDIOLOGY | Facility: CLINIC | Age: 29
End: 2024-10-07
Payer: COMMERCIAL

## (undated) DEVICE — CATHETER, EP QUADRIPOLAR, IBI, 6FR X 110CM, STEERABLE, 2-5-2MM, LARGE CURVE

## (undated) DEVICE — INTRODUCER, SHEATH, FAST-CATH, 7 FR X 23 CM

## (undated) DEVICE — CATHETER, INQUIRY, 6FR X 110CM, 2-5-2MM SPACING, 1MM TIP, LG CURVE STEERABLE

## (undated) DEVICE — CLOSURE SYSTEM, VASCULAR, MVP 6-12FR, VENOUS

## (undated) DEVICE — ELECTRODE KIT, ENSITE X EP SYSTEM SURFACE

## (undated) DEVICE — CATHETER, ABLATION, SAFIRE, 7FR/4MM, LRG CURL, BI-DIRECTIONAL

## (undated) DEVICE — INTRODUCER, SHEATH, FAST-CATH, 8FR X 12CM, C-LOCK